# Patient Record
Sex: MALE | Race: BLACK OR AFRICAN AMERICAN | NOT HISPANIC OR LATINO | Employment: STUDENT | URBAN - METROPOLITAN AREA
[De-identification: names, ages, dates, MRNs, and addresses within clinical notes are randomized per-mention and may not be internally consistent; named-entity substitution may affect disease eponyms.]

---

## 2024-03-31 ENCOUNTER — HOSPITAL ENCOUNTER (EMERGENCY)
Facility: HOSPITAL | Age: 19
Discharge: HOME | End: 2024-04-01
Attending: FAMILY MEDICINE
Payer: COMMERCIAL

## 2024-03-31 VITALS
TEMPERATURE: 99 F | DIASTOLIC BLOOD PRESSURE: 95 MMHG | HEART RATE: 88 BPM | WEIGHT: 185 LBS | BODY MASS INDEX: 25.06 KG/M2 | SYSTOLIC BLOOD PRESSURE: 142 MMHG | OXYGEN SATURATION: 96 % | RESPIRATION RATE: 18 BRPM | HEIGHT: 72 IN

## 2024-03-31 DIAGNOSIS — T07.XXXA MULTIPLE ABRASIONS: ICD-10-CM
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
## 2024-03-31 DIAGNOSIS — M79.601 RIGHT ARM PAIN: Primary | ICD-10-CM

## 2024-03-31 PROCEDURE — 99283 EMERGENCY DEPT VISIT LOW MDM: CPT | Mod: 25

## 2024-03-31 PROCEDURE — 25000003 PHARM REV CODE 250: Performed by: NURSE PRACTITIONER

## 2024-03-31 RX ORDER — MUPIROCIN 20 MG/G
1 OINTMENT TOPICAL
Status: COMPLETED | OUTPATIENT
Start: 2024-03-31 | End: 2024-03-31

## 2024-03-31 RX ADMIN — MUPIROCIN 1 TUBE: 20 OINTMENT TOPICAL at 10:03

## 2024-03-31 NOTE — Clinical Note
"Dima Sage" Elder was seen and treated in our emergency department on 3/31/2024.  He may return to school on 04/03/2024.      If you have any questions or concerns, please don't hesitate to call.      Kota Puente, NP"

## 2024-03-31 NOTE — Clinical Note
"Dima Sage" Elder was seen and treated in our emergency department on 3/31/2024.  He may return to work on 04/03/2024.       If you have any questions or concerns, please don't hesitate to call.      Kota Puente, NP"

## 2024-04-01 RX ORDER — HYDROCODONE BITARTRATE AND ACETAMINOPHEN 5; 325 MG/1; MG/1
1 TABLET ORAL EVERY 6 HOURS PRN
Qty: 12 TABLET | Refills: 0 | Status: SHIPPED | OUTPATIENT
Start: 2024-04-01
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
